# Patient Record
Sex: FEMALE | Race: BLACK OR AFRICAN AMERICAN | NOT HISPANIC OR LATINO | Employment: OTHER | URBAN - METROPOLITAN AREA
[De-identification: names, ages, dates, MRNs, and addresses within clinical notes are randomized per-mention and may not be internally consistent; named-entity substitution may affect disease eponyms.]

---

## 2019-07-19 PROBLEM — M25.561 RIGHT MEDIAL KNEE PAIN: Status: ACTIVE | Noted: 2019-04-04

## 2019-07-19 PROBLEM — I10 ESSENTIAL (PRIMARY) HYPERTENSION: Status: ACTIVE | Noted: 2019-04-04

## 2019-07-19 PROBLEM — R26.89 IMPAIRED GAIT AND MOBILITY: Status: ACTIVE | Noted: 2019-04-04

## 2020-01-07 PROBLEM — R07.9 CHEST PAIN: Status: ACTIVE | Noted: 2020-01-07

## 2020-01-08 PROBLEM — M54.2 NECK PAIN: Status: ACTIVE | Noted: 2020-01-08

## 2020-01-08 PROBLEM — D50.9 MICROCYTIC ANEMIA: Status: ACTIVE | Noted: 2020-01-08

## 2020-01-09 PROBLEM — M54.12 CERVICAL RADICULAR PAIN: Status: ACTIVE | Noted: 2020-01-08

## 2020-01-09 PROBLEM — R07.9 CHEST PAIN: Status: RESOLVED | Noted: 2020-01-07 | Resolved: 2020-01-09

## 2023-06-30 PROBLEM — Z12.11 ENCOUNTER FOR SCREENING COLONOSCOPY: Status: ACTIVE | Noted: 2023-06-30

## 2024-04-10 PROBLEM — R06.09 DYSPNEA ON EXERTION: Status: ACTIVE | Noted: 2024-04-10

## 2024-04-10 PROBLEM — I16.1 HYPERTENSIVE EMERGENCY: Status: ACTIVE | Noted: 2024-04-10

## 2024-04-10 PROBLEM — R06.09 DYSPNEA ON EXERTION: Status: RESOLVED | Noted: 2024-04-10 | Resolved: 2024-04-10

## 2024-04-10 PROBLEM — I16.1 HYPERTENSIVE EMERGENCY: Status: RESOLVED | Noted: 2024-04-10 | Resolved: 2024-04-10

## 2024-04-10 PROBLEM — K11.21 ACUTE PAROTITIS: Status: ACTIVE | Noted: 2024-04-10

## 2024-04-10 PROBLEM — K11.20 PAROTITIS: Status: ACTIVE | Noted: 2024-04-10

## 2024-04-10 PROBLEM — R07.9 CHEST PAIN: Status: RESOLVED | Noted: 2020-01-07 | Resolved: 2024-04-10

## 2024-05-12 ENCOUNTER — PATIENT OUTREACH (OUTPATIENT)
Dept: ADMINISTRATIVE | Facility: HOSPITAL | Age: 59
End: 2024-05-12

## 2024-09-17 PROBLEM — D25.1 FIBROIDS, INTRAMURAL: Status: ACTIVE | Noted: 2024-09-17

## 2024-09-17 PROBLEM — E66.9 OBESITY: Status: ACTIVE | Noted: 2024-09-17

## 2024-09-17 PROBLEM — A63.0 CONDYLOMA ACUMINATA: Status: ACTIVE | Noted: 2024-09-17

## 2024-09-17 PROBLEM — N95.0 POSTMENOPAUSAL BLEEDING: Status: ACTIVE | Noted: 2024-09-17

## 2024-09-17 PROBLEM — N92.1 MENORRHAGIA WITH IRREGULAR CYCLE: Status: ACTIVE | Noted: 2024-09-17

## 2025-04-22 ENCOUNTER — PATIENT OUTREACH (OUTPATIENT)
Dept: ADMINISTRATIVE | Facility: HOSPITAL | Age: 60
End: 2025-04-22

## 2025-05-16 ENCOUNTER — PATIENT OUTREACH (OUTPATIENT)
Dept: ADMINISTRATIVE | Facility: HOSPITAL | Age: 60
End: 2025-05-16

## 2025-05-16 NOTE — PROGRESS NOTES
5-16-25- please address open care gap mammogram screening noted on 5-19-25 appt notes    Care everywhere updated